# Patient Record
Sex: FEMALE | Race: WHITE
[De-identification: names, ages, dates, MRNs, and addresses within clinical notes are randomized per-mention and may not be internally consistent; named-entity substitution may affect disease eponyms.]

---

## 2019-01-14 ENCOUNTER — HOSPITAL ENCOUNTER (OUTPATIENT)
Dept: HOSPITAL 92 - SCSMRI | Age: 44
Discharge: HOME | End: 2019-01-14
Attending: PODIATRIST
Payer: COMMERCIAL

## 2019-01-14 DIAGNOSIS — T84.9XXA: Primary | ICD-10-CM

## 2019-01-14 DIAGNOSIS — M20.22: ICD-10-CM

## 2019-01-14 NOTE — MRI
LEFT FOREFOOT MRI WITHOUT IV CONTRAST:

 

History: 

Prior Cartiva implant placed in the summer of 2018, now having foot and big toe stiffness and pain. 

 

Technique: 

Multiplanar, multisequence MRI examination of the forefoot is performed. 

 

FINDINGS: 

There is evidence for an implant in the distal fifth metatarsal. There is some minimal fluid density 
at the proximal portion of the implant as well as some marrow edema in the first metatarsal extending
 to the mid portion of the metatarsal. There is abnormal signal associated with the lateral sesamoid 
at the metatarsal phalangeal joint, evidence for fracture or sesamoiditis. The medial sesamoid does n
ot show any significant abnormal marrow edema. There are arthrosis changes of the first metatarsal ph
alangeal joint. Both sesamoids appear to be slightly more cranially positioned, this could possibly s
uggest some associated ligamentous injury. 

 

IMPRESSION: 

Cartiva implant in the distal first metatarsal with minimal fluid density adjacent to it as well as a
bnormal marrow signal extending in the first metatarsal to the mid portion raising concern for the po
ssibility of infection. This could conceivably be related to loosening. Poorly defined metatarsal pha
langeal joint with a small amount of fluid. Abnormal marrow signal in the lateral sesamoid bone which
 has a somewhat fragmented appearance, possibly related to fracture or a bipartite sesamoid with sesa
moiditis. Both of the sesamoids may be very slightly more cephalad in location, raising concern for s
ome nonspecific ligamentous injury at the first metatarsal phalangeal joint. This joint space is narr
owed and poorly defined. No evidence for drainable abscess. 

 

POS: BRENDA

## 2020-02-06 NOTE — HP
HISTORY OF PRESENT ILLNESS:  Ms. Joya is a very pleasant 44-year-old woman, here

today to discuss several years worth of posterior neck pain that at times it radiate

into her arm that in the spring of 2019 became severe and has not relented ever

since.  Pain radiates into the right upper extremity down to the hand and fits a

combination of both C6 and C7 components.  She has numbness and tingling in the same

distribution and feels at times that does affect her handwriting.  She has attempted

physical therapy, chiropractics, over-the-counter prescription medications, home

traction and exercises, trigger point injections as well as epidural steroid

injection, all of which provided little if any relief.  Her pains are worsened with

activity over shoulder height and when looking certain ways.  MRI from [facility] reveals bilateral neuroforaminal narrowing at multiple levels, however, is

most severe from C5-C7 and fits her symptoms well. 



PHYSICAL EXAMINATION:

GENERAL:  She is alert and oriented x3.  Gait is normal.  No ataxia. 

EXTREMITIES:  Upper extremity motor exam reveals 5/5 strength in all movements in

the bilateral upper extremities.  She has a positive right Spurling's maneuver but

negative on the left. 



PAST MEDICAL HISTORY:  Significant for neck pain.



CURRENT MEDICATIONS:  

1. Aleve.

2. Methocarbamol.

3. Chantix.



ALLERGIES:  PENICILLIN, __________ ANTIBIOTICS.



SURGICAL HISTORY:  Unspecified left toe surgery, partial hysterectomy and

laparoscopy. 



ASSESSMENT:  Cervical radiculopathy.



PLAN:  Dr. Beauchamp met with the patient, reviewed imaging, and advocated for a C5-C7

ACDF.  He explained to the patient risks, benefits, and alternatives to the

procedure.  The patient expressed understanding and elected to move forward with

surgery as discussed.  I do believe the patient is mentally competent and capable of

making medical decisions for herself.  We will move forward with surgery as planned. 







Job ID:  [id number]

## 2020-02-07 ENCOUNTER — HOSPITAL ENCOUNTER (OUTPATIENT)
Dept: HOSPITAL 92 - SDC | Age: 45
Discharge: HOME | End: 2020-02-07
Attending: NEUROLOGICAL SURGERY
Payer: COMMERCIAL

## 2020-02-07 VITALS — BODY MASS INDEX: 29.6 KG/M2

## 2020-02-07 DIAGNOSIS — Z79.1: ICD-10-CM

## 2020-02-07 DIAGNOSIS — Z88.0: ICD-10-CM

## 2020-02-07 DIAGNOSIS — Z88.1: ICD-10-CM

## 2020-02-07 DIAGNOSIS — M54.12: Primary | ICD-10-CM

## 2020-02-07 DIAGNOSIS — Z79.899: ICD-10-CM

## 2020-02-07 PROCEDURE — 0RG2070 FUSION OF 2 OR MORE CERVICAL VERTEBRAL JOINTS WITH AUTOLOGOUS TISSUE SUBSTITUTE, ANTERIOR APPROACH, ANTERIOR COLUMN, OPEN APPROACH: ICD-10-PCS | Performed by: NEUROLOGICAL SURGERY

## 2020-02-07 PROCEDURE — C1776 JOINT DEVICE (IMPLANTABLE): HCPCS

## 2020-02-07 PROCEDURE — 0RT30ZZ RESECTION OF CERVICAL VERTEBRAL DISC, OPEN APPROACH: ICD-10-PCS | Performed by: NEUROLOGICAL SURGERY

## 2020-02-07 PROCEDURE — C1713 ANCHOR/SCREW BN/BN,TIS/BN: HCPCS

## 2020-02-07 PROCEDURE — 76000 FLUOROSCOPY <1 HR PHYS/QHP: CPT

## 2020-02-07 PROCEDURE — 0RG20A0 FUSION OF 2 OR MORE CERVICAL VERTEBRAL JOINTS WITH INTERBODY FUSION DEVICE, ANTERIOR APPROACH, ANTERIOR COLUMN, OPEN APPROACH: ICD-10-PCS | Performed by: NEUROLOGICAL SURGERY

## 2020-02-07 PROCEDURE — 0RG20J0 FUSION OF 2 OR MORE CERVICAL VERTEBRAL JOINTS WITH SYNTHETIC SUBSTITUTE, ANTERIOR APPROACH, ANTERIOR COLUMN, OPEN APPROACH: ICD-10-PCS | Performed by: NEUROLOGICAL SURGERY

## 2020-02-07 NOTE — OP
DATE OF PROCEDURE:  02/07/2020



FIRST ASSISTANT:  Tip Mehta PA-C.



INDICATION:  Pain.



DIAGNOSIS:  Cervical radiculopathy.



PROCEDURE PERFORMED:  Anterior cervical diskectomy and fusion, C5 through C7.



ANESTHESIA:  General.



DESCRIPTION OF PROCEDURE:  The patient was brought into the operating room and

placed under general anesthesia.  She was placed on table in a supine position.  A

transverse incision was planned over the lateral aspect of the neck on the right.

After prepping and draping and after an appropriate preoperative pause, the incision

was created.  The underlying platysma muscle was identified and incised.  A blunt

tissue plane anterior to the sternocleidomastoid muscle was used to gain access to

the prevertebral space.  Vein retractors were placed and a C-arm image was obtained

to confirm the appropriate level.  An annulotomy was then performed at the C6-C7

disk space.  All disk material as well as anterior posterior osteophytes were

removed.  After complete decompression, a 6-mm lordotic PEEK cage packed with

allograft and autograft material was placed within the interbody space.  We then

redirected our attention to the level above at C5-C6, where again an annulotomy was

performed.  All disk material as well as anterior and posterior osteophytes were

removed.  After completing the decompression, a 6-mm lordotic PEEK cage packed with

allograft and autograft material was placed within the interbody space.  An anterior

cervical plate was then fashioned to the front of spine and secured with a total of

6 screws.  Midline and lateral structures were then inspected and found to be free

from significant trauma.  The wound was irrigated.  Hemostasis was maintained

throughout.  The wound was then closed in anatomic layers and a pressure dressing

was applied.  There were no known procedural complications. 







Job ID:  [id number]

## 2022-09-23 ENCOUNTER — HOSPITAL ENCOUNTER (OUTPATIENT)
Dept: HOSPITAL 92 - CSHLAB | Age: 47
Discharge: HOME | End: 2022-09-23
Attending: PODIATRIST
Payer: COMMERCIAL

## 2022-09-23 DIAGNOSIS — M20.5X1: ICD-10-CM

## 2022-09-23 DIAGNOSIS — Z01.812: Primary | ICD-10-CM

## 2022-09-23 LAB
ANION GAP SERPL CALC-SCNC: 15 MMOL/L (ref 10–20)
BUN SERPL-MCNC: 17 MG/DL (ref 7–18.7)
CALCIUM SERPL-MCNC: 9.7 MG/DL (ref 7.8–10.44)
CHLORIDE SERPL-SCNC: 102 MMOL/L (ref 98–107)
CO2 SERPL-SCNC: 27 MMOL/L (ref 22–29)
CREAT CL PREDICTED SERPL C-G-VRATE: 0 ML/MIN (ref 70–130)
GLUCOSE SERPL-MCNC: 138 MG/DL (ref 70–105)
HGB BLD-MCNC: 13.4 G/DL (ref 12–15.5)
MCH RBC QN AUTO: 32.1 PG (ref 27–33)
MCV RBC AUTO: 94 FL (ref 81.6–98.3)
PLATELET # BLD AUTO: 339 10X3/UL (ref 150–450)
POTASSIUM SERPL-SCNC: 3.8 MMOL/L (ref 3.5–5.1)
RBC # BLD AUTO: 4.17 10X6/UL (ref 3.9–5.03)
SODIUM SERPL-SCNC: 140 MMOL/L (ref 136–145)
WBC # BLD AUTO: 8.1 10X3/UL (ref 3.5–10.5)

## 2022-09-23 PROCEDURE — 80048 BASIC METABOLIC PNL TOTAL CA: CPT

## 2022-09-23 PROCEDURE — 85027 COMPLETE CBC AUTOMATED: CPT

## 2022-09-28 ENCOUNTER — HOSPITAL ENCOUNTER (OUTPATIENT)
Dept: HOSPITAL 92 - CSHSDC | Age: 47
Discharge: HOME | End: 2022-09-28
Attending: PODIATRIST
Payer: COMMERCIAL

## 2022-09-28 VITALS — BODY MASS INDEX: 28.8 KG/M2

## 2022-09-28 DIAGNOSIS — Z88.0: ICD-10-CM

## 2022-09-28 DIAGNOSIS — I10: ICD-10-CM

## 2022-09-28 DIAGNOSIS — M19.071: ICD-10-CM

## 2022-09-28 DIAGNOSIS — M79.671: ICD-10-CM

## 2022-09-28 DIAGNOSIS — Z79.899: ICD-10-CM

## 2022-09-28 DIAGNOSIS — Z88.8: ICD-10-CM

## 2022-09-28 DIAGNOSIS — F17.200: ICD-10-CM

## 2022-09-28 DIAGNOSIS — M20.21: Primary | ICD-10-CM

## 2022-09-28 PROCEDURE — C1713 ANCHOR/SCREW BN/BN,TIS/BN: HCPCS

## 2022-09-28 PROCEDURE — S0020 INJECTION, BUPIVICAINE HYDRO: HCPCS

## 2022-09-28 PROCEDURE — C1776 JOINT DEVICE (IMPLANTABLE): HCPCS

## 2022-09-28 PROCEDURE — 0SRM0JZ REPLACEMENT OF RIGHT METATARSAL-PHALANGEAL JOINT WITH SYNTHETIC SUBSTITUTE, OPEN APPROACH: ICD-10-PCS | Performed by: PODIATRIST
